# Patient Record
Sex: FEMALE | Race: ASIAN | NOT HISPANIC OR LATINO | URBAN - METROPOLITAN AREA
[De-identification: names, ages, dates, MRNs, and addresses within clinical notes are randomized per-mention and may not be internally consistent; named-entity substitution may affect disease eponyms.]

---

## 2018-04-12 ENCOUNTER — COMMUNICATION - HEALTHEAST (OUTPATIENT)
Dept: SURGERY | Facility: CLINIC | Age: 28
End: 2018-04-12

## 2018-06-05 ENCOUNTER — OFFICE VISIT - HEALTHEAST (OUTPATIENT)
Dept: SURGERY | Facility: CLINIC | Age: 28
End: 2018-06-05

## 2018-06-05 DIAGNOSIS — E88.819 INSULIN RESISTANCE: ICD-10-CM

## 2018-06-05 DIAGNOSIS — E66.9 OBESITY (BMI 30-39.9): ICD-10-CM

## 2018-06-05 DIAGNOSIS — R03.0 ELEVATED BLOOD PRESSURE READING: ICD-10-CM

## 2018-06-05 RX ORDER — METFORMIN HCL 500 MG
TABLET, EXTENDED RELEASE 24 HR ORAL
Qty: 90 TABLET | Refills: 1 | Status: SHIPPED | OUTPATIENT
Start: 2018-06-05

## 2018-06-05 ASSESSMENT — MIFFLIN-ST. JEOR: SCORE: 1744.87

## 2018-07-23 ENCOUNTER — COMMUNICATION - HEALTHEAST (OUTPATIENT)
Dept: SURGERY | Facility: CLINIC | Age: 28
End: 2018-07-23

## 2018-08-07 ENCOUNTER — COMMUNICATION - HEALTHEAST (OUTPATIENT)
Dept: SURGERY | Facility: CLINIC | Age: 28
End: 2018-08-07

## 2021-06-01 VITALS — BODY MASS INDEX: 36 KG/M2 | HEIGHT: 66 IN | WEIGHT: 224 LBS

## 2021-06-16 PROBLEM — R03.0 ELEVATED BLOOD PRESSURE READING: Status: ACTIVE | Noted: 2018-06-05

## 2021-06-16 PROBLEM — E66.9 OBESITY (BMI 30-39.9): Status: ACTIVE | Noted: 2018-06-05

## 2021-06-18 NOTE — PROGRESS NOTES
HPI:  Vinay Jin is a 27 y.o. year old female with weight related co-morbidities of   Patient Active Problem List   Diagnosis     Elevated blood pressure reading     Obesity (BMI 30-39.9)    who presents for medical bariatric consultation in the setting of weight related co-morbidities.  Her BMI is Body mass index is 36.71 kg/(m^2)..      Assessment: Vinay is a 27 y.o. year old female who presents for medical weight management.      Plan:    1. Elevated blood pressure reading  Patient is not interested in starting blood pressure medicine today.  She will follow-up with her primary doctor.  She has an ultrasound of her kidneys scheduled.  She is aware that we cannot prescribe phentermine and lost her blood pressure is under control.    2. Obesity (BMI 30-39.9)  We discussed healthy habits to assist with weight loss. We discussed medication that may assist with weight loss. Metformin was prescribed. Risks/ benefits and possible side effects were discussed and questions were answered. Written information was given.     3. Insulin resistance  Recent A1c was 5.6.  She likely has polycystic ovarian syndrome.  We discussed insulin resistance and how this can interfere with weight loss.  We discussed healthy habits to improve this.  She will start metformin.    Follow up: In 1 month with the dietitian in 1 month later with myself      >45 minutes spent with patient, > 50% spent in counseling          Counseling:  We discussed [facility] Bariatric Basics including:  -eating 3 meals daily  -eating protein first  -eating slowly, chewing food well  -avoiding/limiting calorie containing beverages  -choosing wheat, not white with breads, crackers, pastas, krystal, bagels, tortillas, rice  -limiting carbohydrates in general  -limiting restaurant or cafeteria eating to twice a week or less    We discussed the importance of restorative sleep and stress management in maintaining a healthy weight.    We reviewed medications associated  with weight gain.    We discussed insulin resistance and glycemic index as it relates to appetite and weight control.     We discussed the National Weight Control Registry healthy weight maintenance strategies and ways to optimize metabolism.  We discussed the importance of physical activity including cardiovascular and strength training in maintaining a healthier weight and explored viable options.    We discussed medications available for weight loss including phentermine, phendimetrazine, topamax, qsymia, lorcaserin, contrave, diethylproprion, and orlistat. We discussed the risks and benefits of each. We discussed indications, contraindications, potential side effects, and estimated costs of each. Literature was offered.    History Surrouding Consultation:  Struggles with weight started at age 12  Her weight at age 18 was 190#  She has had several past supervised and unsupervised weight loss attempts  The most weight lost was: 20 #  Unfortunately there was not durable weight maintenance.  History of bulimia, anorexia, or binge eating disorder? NO  If Present has eating disorder been in remission at least 3 years? NA    Dietary History  Meals per day: 2-3  Snacks: rare  Typical Snack: varies  Who does the grocery shopping? Patient and sister  Who does the cooking? Patient and sister  A typical meal includes: B: oatmeal and toast or coffee, fruit  L: leftovers, noodles  D: eggs and rice or bagel  Regular Pop: none  Juice: orange and apple juice, 24 oz a couple times a month  Caffeine: 1 cup of coffee with sugar and cream  Amount of restaurant eating per week: 3   Eating a the table with the TV off? no    Physical Activity Patterns  Current physical activity routine includes: runs on treadmill for 15 min then walks for 45 minutes (last went 2 weeks ago) aims for 3 x per week, 30 min weight workout on those same days    Limitations from being physically active on a regular basis includes: time (work and school),  "energy        PMH:History reviewed. No pertinent past medical history.    Past Surgical Hx:  Past Surgical History:   Procedure Laterality Date     NO PAST SURGERIES         Medication:  No current outpatient prescriptions on file prior to visit.     No current facility-administered medications on file prior to visit.        Allergies:Review of patient's allergies indicates no known allergies.    Family Hx:  Family History   Problem Relation Age of Onset     Diabetes Mother      Obesity Mother      Hypertension Mother      Diabetes Father      Hyperlipidemia Father      Hypertension Father      Stroke Father        Social Hx:  Social History     Social History     Marital status: Single     Spouse name: N/A     Number of children: N/A     Years of education: N/A     Occupational History     Not on file.     Social History Main Topics     Smoking status: Never Smoker     Smokeless tobacco: Never Used     Alcohol use Yes     Drug use: Not on file     Sexual activity: Not on file     Other Topics Concern     Not on file     Social History Narrative     No narrative on file       Tobacco Use Hx:  History   Smoking Status     Never Smoker   Smokeless Tobacco     Never Used       ROS  General  Fatigue: yes  Sleep Quality:fair  HEENT  Hx of glaucoma: no  Vision changes: no  Cardiovascular  Chest Pain with Exertion: no  Palpitations: no  Hx of heart disease: no  Pulmonary  Shortness of breath at rest: no  Shortness of breath with exertion: no  Snoring: no  Stop-bang score: 2  [place] Score: 6  Gastrointestinal  Heartburn: no  Abdominal pain: no  Psychiatric  Moods Stable: yes  Endocrine  Polydipsia: yes  Polyuria: yes  Neurologic:  Hx of seizures: unsure- possibly when she was very young  Dermatologic  Rashes: no      BP (!) 164/104  Pulse 83  Resp 18  Ht 5' 5.5\" (1.664 m)  Wt (!) 224 lb (101.6 kg)  SpO2 97%  Breastfeeding? No  BMI 36.71 kg/m2  Wt Readings from Last 2 Encounters:   06/05/18 (!) 224 lb (101.6 kg) " "  07/30/16 210 lb (95.3 kg)     Body mass index is 36.71 kg/(m^2).  Neck circumference/Waist Circumference: Height: 5' 5.5\" (1.664 m) (6/5/2018  8:56 AM)  Initial Weight: 224 lbs (6/5/2018  8:56 AM)  Weight: 224 lb (101.6 kg) (6/5/2018  8:56 AM)  Weight loss from initial: 0 (6/5/2018  8:56 AM)  % Weight loss: 0 % (6/5/2018  8:56 AM)  BMI (Calculated): 36.7 (6/5/2018  8:56 AM)  SpO2: 97 % (6/5/2018  8:56 AM)  Waist Circumference (In): 42 Inches (6/5/2018  8:56 AM)  Hip Circumference (In): 50 Inches (6/5/2018  8:56 AM)  Neck Circumference (In): 15.25 Inches (6/5/2018  8:56 AM)      Physical Exam:    GEN: Alert and oriented in no acute distress.   HEENT: PERRLA, mucous membranes moist. Airway adequate  NECK: Supple without LAD or thyromegaly. Carotid bruits absent.  LUNGS: CTA without wheezes or crackles, good air movement throughout  CV: RRR no MRG  ABDOMEN: moderateprotuberance, BS normal, non tender to palpation, no rebound or guarding, no HSM  SKIN: no rashes, few skin tags, moderate acanthosis nigrans  EXTREMITIES: no edema, dorsalis pedis palpable    Labs:    No results found for: WBC, HGB, HCT, MCV, PLT  No results found for: CHOL  No results found for: HDL  No results found for: LDLCALC  No results found for: TRIG  No components found for: CHOLHDL  No results found for: ALT, AST, GGT, ALKPHOS, BILITOT  No results found for: HGBA1C  No results found for: [id number]  "